# Patient Record
Sex: FEMALE | Race: OTHER | HISPANIC OR LATINO | Employment: PART TIME | URBAN - METROPOLITAN AREA
[De-identification: names, ages, dates, MRNs, and addresses within clinical notes are randomized per-mention and may not be internally consistent; named-entity substitution may affect disease eponyms.]

---

## 2018-10-24 ENCOUNTER — APPOINTMENT (EMERGENCY)
Dept: RADIOLOGY | Facility: HOSPITAL | Age: 23
End: 2018-10-24
Payer: COMMERCIAL

## 2018-10-24 ENCOUNTER — HOSPITAL ENCOUNTER (EMERGENCY)
Facility: HOSPITAL | Age: 23
Discharge: HOME | End: 2018-10-24
Attending: EMERGENCY MEDICINE | Admitting: EMERGENCY MEDICINE
Payer: COMMERCIAL

## 2018-10-24 VITALS
DIASTOLIC BLOOD PRESSURE: 70 MMHG | SYSTOLIC BLOOD PRESSURE: 148 MMHG | TEMPERATURE: 98.2 F | HEART RATE: 94 BPM | OXYGEN SATURATION: 100 % | RESPIRATION RATE: 18 BRPM

## 2018-10-24 DIAGNOSIS — R11.0 NAUSEA: ICD-10-CM

## 2018-10-24 DIAGNOSIS — J06.9 UPPER RESPIRATORY INFECTION, ACUTE: Primary | ICD-10-CM

## 2018-10-24 PROCEDURE — 71046 X-RAY EXAM CHEST 2 VIEWS: CPT

## 2018-10-24 PROCEDURE — 94640 AIRWAY INHALATION TREATMENT: CPT

## 2018-10-24 PROCEDURE — 99283 EMERGENCY DEPT VISIT LOW MDM: CPT

## 2018-10-24 RX ORDER — METOCLOPRAMIDE 10 MG/1
10 TABLET ORAL ONCE
Status: COMPLETED | OUTPATIENT
Start: 2018-10-24 | End: 2018-10-24

## 2018-10-24 RX ORDER — METOCLOPRAMIDE 10 MG/1
10 TABLET ORAL EVERY 6 HOURS
Qty: 10 TABLET | Refills: 0 | Status: SHIPPED | OUTPATIENT
Start: 2018-10-24 | End: 2019-06-12 | Stop reason: ALTCHOICE

## 2018-10-24 RX ORDER — BENZONATATE 100 MG/1
100 CAPSULE ORAL EVERY 8 HOURS
Qty: 6 CAPSULE | Refills: 0 | Status: SHIPPED | OUTPATIENT
Start: 2018-10-24 | End: 2019-06-12 | Stop reason: ALTCHOICE

## 2018-10-24 RX ADMIN — METOCLOPRAMIDE HYDROCHLORIDE 10 MG: 10 TABLET ORAL at 09:38

## 2018-10-24 RX ADMIN — IPRATROPIUM BROMIDE 0.5 MG: 0.5 SOLUTION RESPIRATORY (INHALATION) at 09:39

## 2018-10-24 RX ADMIN — ALBUTEROL SULFATE 5 MG: 2.5 SOLUTION RESPIRATORY (INHALATION) at 09:38

## 2018-10-24 NOTE — ED PROVIDER NOTES
History  Chief Complaint   Patient presents with    Cough     pt reports clear productive cough since yesterday  pt denies fever  pt also c/o chest congestion  History provided by:  Patient  Cough   Cough characteristics:  Productive  Sputum characteristics:  Clear  Duration:  1 day  Timing:  Constant  Progression:  Unchanged  Chronicity:  New  Context: not sick contacts    Relieved by:  None tried  Worsened by:  Nothing  Ineffective treatments:  None tried  Associated symptoms: rhinorrhea and sinus congestion    Associated symptoms: no chest pain, no chills, no ear pain, no eye discharge, no fever, no rash, no shortness of breath and no sore throat        None       History reviewed  No pertinent past medical history  History reviewed  No pertinent surgical history  History reviewed  No pertinent family history  I have reviewed and agree with the history as documented  Social History   Substance Use Topics    Smoking status: Never Smoker    Smokeless tobacco: Never Used    Alcohol use No        Review of Systems   Constitutional: Negative for chills and fever  HENT: Positive for rhinorrhea  Negative for congestion, ear discharge, ear pain, postnasal drip, sinus pressure, sneezing, sore throat and trouble swallowing  Eyes: Negative for discharge and redness  Respiratory: Positive for cough and chest tightness  Negative for shortness of breath  Cardiovascular: Negative for chest pain  Gastrointestinal: Positive for nausea and vomiting  Negative for abdominal pain and diarrhea  Skin: Negative for rash  All other systems reviewed and are negative  Physical Exam  Physical Exam   Constitutional: She is oriented to person, place, and time  She appears well-developed and well-nourished  Non-toxic appearance  She does not have a sickly appearance  She does not appear ill  HENT:   Head: Normocephalic and atraumatic  Right Ear: Tympanic membrane normal  No drainage   Tympanic membrane is not injected, not erythematous and not bulging  No middle ear effusion  Left Ear: Tympanic membrane normal  No drainage  Tympanic membrane is not injected, not erythematous and not bulging  No middle ear effusion  Nose: Rhinorrhea present  Mouth/Throat: Oropharynx is clear and moist and mucous membranes are normal  No oropharyngeal exudate, posterior oropharyngeal edema, posterior oropharyngeal erythema or tonsillar abscesses  No tonsillar exudate  Eyes: Conjunctivae are normal  Right eye exhibits no discharge  Left eye exhibits no discharge  Right conjunctiva is not injected  Left conjunctiva is not injected  Neck: Normal range of motion  Neck supple  No neck rigidity  Cardiovascular: Normal rate, regular rhythm, normal heart sounds and intact distal pulses  Exam reveals no gallop and no friction rub  No murmur heard  Pulmonary/Chest: Effort normal and breath sounds normal  No stridor  No respiratory distress  She has no wheezes  She has no rales  Abdominal: Soft  Bowel sounds are normal  She exhibits no distension  There is no tenderness  There is no rebound and no guarding  Lymphadenopathy:     She has no cervical adenopathy  Neurological: She is alert and oriented to person, place, and time  Skin: Skin is warm and dry  No rash noted  No pallor  Nursing note and vitals reviewed        Vital Signs  ED Triage Vitals [10/24/18 0922]   Temperature Pulse Respirations Blood Pressure SpO2   98 2 °F (36 8 °C) 94 18 148/70 100 %      Temp Source Heart Rate Source Patient Position - Orthostatic VS BP Location FiO2 (%)   Oral Monitor Sitting Right arm --      Pain Score       --           Vitals:    10/24/18 0922   BP: 148/70   Pulse: 94   Patient Position - Orthostatic VS: Sitting       Visual Acuity      ED Medications  Medications   metoclopramide (REGLAN) tablet 10 mg (10 mg Oral Given 10/24/18 0938)   albuterol inhalation solution 5 mg (5 mg Nebulization Given 10/24/18 0938) ipratropium (ATROVENT) 0 02 % inhalation solution 0 5 mg (0 5 mg Nebulization Given 10/24/18 8197)       Diagnostic Studies  Results Reviewed     None                 XR chest 2 views   ED Interpretation by Pauline Yañez 24, DO (10/24 1557)   No acute abnormalities                 Procedures  Procedures       Phone Contacts  ED Phone Contact    ED Course  ED Course as of Oct 24 1001   Wed Oct 24, 2018   0957 Pt updated on results  Asking for a work note  [facility]  Number of Diagnoses or Management Options     Amount and/or Complexity of Data Reviewed  Tests in the radiology section of CPT®: ordered and reviewed      CritCare Time    Disposition  Final diagnoses:   Upper respiratory infection, acute   Nausea     Time reflects when diagnosis was documented in both MDM as applicable and the Disposition within this note     Time User Action Codes Description Comment    10/24/2018  9:43 AM Porsha Adan [J06 9] Upper respiratory infection, acute     10/24/2018  9:58 AM Porsha Adan [R11 0] Nausea       ED Disposition     ED Disposition Condition Comment    Discharge  Bebo Solis discharge to home/self care  Condition at discharge: Good        Follow-up Information    None         Patient's Medications   Discharge Prescriptions    BENZONATATE (TESSALON PERLES) 100 MG CAPSULE    Take 1 capsule (100 mg total) by mouth every 8 (eight) hours       Start Date: 10/24/2018End Date: --       Order Dose: 100 mg       Quantity: 6 capsule    Refills: 0    METOCLOPRAMIDE (REGLAN) 10 MG TABLET    Take 1 tablet (10 mg total) by mouth every 6 (six) hours       Start Date: 10/24/2018End Date: --       Order Dose: 10 mg       Quantity: 10 tablet    Refills: 0     No discharge procedures on file      ED Provider  Electronically Signed by           Pauline Packer DO  10/24/18 1001

## 2018-10-24 NOTE — DISCHARGE INSTRUCTIONS
Síntomas de resfriado   LO QUE NECESITA SABER:   Un resfriado es wilmer infección causada por un virus  La infección causa la inflamación del sistema respiratorio superior  Algunos síntomas comunes de un resfriado incluyen estornudos, garganta seca, congestión nasal, dolor de letitia, ojos llorosos, y tos  La tos podría ser seca o incluso contener flemas  Usted también podría sentir mukul musculares, dolor en las articulaciones y cansancio  La fiebre no es un síntoma común del resfrío carole podría suceder  La mayoría de los resfriados se [place] sin tratamiento  INSTRUCCIONES SOBRE EL DESIRE HOSPITALARIA:   Regrese a la radha de emergencias si:   · Usted siente más cansancio y debilidad  · Usted no puede comer  · Porras corazón está latiendo [employer] rápido de lo normal en porras liu  · Usted nota manchas atilio en la parte de atrás de porras garganta y porras jonathon está inflamado y adolorido al tacto  · Usted nota puntos rojos o morados pequeños o grandes en porras piel  Pregúntele a porras Glory Rinne vitaminas y minerales son adecuados para usted  · Usted tiene fiebre por encima de los 102°F (38 9°C)  · Usted tiene falta de la aire que es reciente o que Ranjeet Motts  · Usted tiene wilmer secreción espesa saliéndole de la nariz por más de 2 días  · Eva síntomas no mejoran o más monique empeoran en cuestión de 5 días  · Usted tiene preguntas o inquietudes acerca de porras condición o cuidado  Medicamentos:  Los [employer]  podrían  ser parte de los medicamentos sugeridos por porras médico para disminuir los síntomas de porras resfrío  Estos medicamentos están disponibles sin receta médica  Pregunte cuáles medicamentos sergei y cuándo tomarlos  \[facility] [place] [facility]\""mecca 645  · Los AINEs o el acetaminofén  ayudan a bajar la fiebre o disminuir el dolor  Los descongestionantes    · Los descongestionantes  ayudan a disminuir la congestión nasal      · Los antihistamínicos  ayudan a disminuir los estornudos y el flujo nasal  · Los jarabes para la tos  ayudan a disminuir la tos  · Los expectorantes  ayudan a aflojar las flemas para que las pueda toser  · [place] katarzyna medicamentos zonia se le haya indicado  Consulte con porras médico si usted dennys que porras medicamento no le está ayudando o si presenta efectos secundarios  Infórmele si es alérgico a algún medicamento  Mantenga wilmer lista actualizada de los [place], las vitaminas y los productos herbales que bessie  Incluya los siguientes datos de los medicamentos: cantidad, frecuencia y motivo de administración  Traiga con usted la lista o los envases de la píldoras a katarzyna citas de seguimiento  Lleve la lista de los medicamentos con usted en liu de wilmer emergencia  Alivio de los síntomas:  Los siguientes podría ayudar a aliviar los síntomas del resfrío, zonia por ejemplo la garganta seca y la congestión:  · Gárgaras con enjuague bucal o agua salada tibia según las instrucciones  · Pastillas para la garganta o golosinas duras  · Vaporizador de grace fría o tibia o humidificador para aliviar la respiración  · [place] de por lo menos 2 días y luego según lo necesario para ir eliminando el cansancio y la debilidad  · Póngase crema a base de petrolato alrededor de las fosas nasales para disminuir la irritación causada por estarse limpiando tanto la nariz  [place] líquidos:  Los líquidos le ayudarán a aflojar y disolver la mucosidad espesa para que la pueda expectorar  Los líquidos también lo mantendrán hidratado  Pregúntele a porras médico cuáles líquidos le recomienda y cuánta cantidad debe sergei cada día  Prevenga la propagación de gérmenes:  Es posible propagar los gérmenes del resfriado a otras personas por lo menos por 3 días después de loni iniciado los síntomas  Lávese las gavin frecuentemente  No comparta artículos, zonia utensilios de cocina  Cúbrase la nariz y la boca cuando tose o estornuda usando la parte de adentro del codo en vez de las gavin   Tire en [address] toallas desechables que usó para limpiarse la nariz  No fume:  El fumar podría empeorar katarzyna síntomas y aumentar la duración de ba resfriado  Hable con ba médico si necesita ayuda para dejar de fumar  Acuda a katarzyna consultas de control con ba médico según le indicaron  Anote katarzyna preguntas para que se acuerde de hacerlas kirsten katarzyna visitas  © 2017 [address] Information is for End User's use only and may not be sold, redistributed or otherwise used for commercial purposes  All illustrations and images included in CareNotes® are the copyrighted property of A D A M , Inc  or Cliff Lind  Esta información es sólo para uso en educación  Ba intención no es darle un consejo médico sobre enfermedades o tratamientos  Colsulte con ba Fatuma Antis farmacéutico antes de seguir cualquier régimen médico para saber si es seguro y efectivo para usted

## 2018-10-24 NOTE — LETTER
October 24, 2018    [address]      Dear Ms Tenorio:    Please contact the emergency department at the telephone number above ASAP regarding results from your visit to the ED on 10/24/2018        Sincerely,             NICOLE Philippe  Nurse Practitioner        CC: No Recipients

## 2018-10-24 NOTE — PROGRESS NOTES
Attempted to call telephone number on file, unable to leave voicemail  Will send letter to contact the ER

## 2018-11-02 NOTE — ED NOTES
11/2/18 at 2:24pm: Pt called stating that she received a letter to call about lab results  Pt states that she was seen for cough and congestion and states that her symptoms improved with the prescriptions given but she still has the cough  Pt informed that the chest x-ray showed possible start of R lower lobe pneumonia  Discussed with pt that will call in a prescription for azithromycin to [facility] at [address] [place] [address]       Anthony De Anda PA-C  11/02/18 2503

## 2018-12-04 ENCOUNTER — APPOINTMENT (EMERGENCY)
Dept: RADIOLOGY | Facility: HOSPITAL | Age: 23
End: 2018-12-04
Payer: COMMERCIAL

## 2018-12-04 ENCOUNTER — HOSPITAL ENCOUNTER (EMERGENCY)
Facility: HOSPITAL | Age: 23
Discharge: LEFT BEFORE DISCHARGE | End: 2018-12-04
Admitting: EMERGENCY MEDICINE
Payer: COMMERCIAL

## 2018-12-04 ENCOUNTER — HOSPITAL ENCOUNTER (EMERGENCY)
Facility: HOSPITAL | Age: 23
Discharge: HOME | End: 2018-12-04
Admitting: EMERGENCY MEDICINE
Payer: COMMERCIAL

## 2018-12-04 VITALS
HEART RATE: 96 BPM | TEMPERATURE: 97.9 F | SYSTOLIC BLOOD PRESSURE: 113 MMHG | DIASTOLIC BLOOD PRESSURE: 65 MMHG | RESPIRATION RATE: 17 BRPM | OXYGEN SATURATION: 100 %

## 2018-12-04 VITALS
SYSTOLIC BLOOD PRESSURE: 130 MMHG | DIASTOLIC BLOOD PRESSURE: 60 MMHG | RESPIRATION RATE: 16 BRPM | TEMPERATURE: 97.7 F | OXYGEN SATURATION: 100 % | HEART RATE: 82 BPM | WEIGHT: 196.6 LBS

## 2018-12-04 DIAGNOSIS — M79.673 FOOT PAIN: Primary | ICD-10-CM

## 2018-12-04 PROCEDURE — 73630 X-RAY EXAM OF FOOT: CPT

## 2018-12-04 PROCEDURE — 99283 EMERGENCY DEPT VISIT LOW MDM: CPT

## 2018-12-04 NOTE — ED NOTES
X-ray here to perform portable x-ray at bedside  Pt  not in room       Coty Johnson RN  12/04/18 7008

## 2018-12-04 NOTE — ED NOTES
Assumed care of pt at this time  Pt no longer in room or bathroom  Provider made aware        Radha Westfall RN  12/04/18 1886

## 2018-12-04 NOTE — ED PROVIDER NOTES
History  Chief Complaint   Patient presents with    Foot Injury     [place] phone utilized for triage-patient Indonesian speaking  Reports right foot pain, shares that dresser fell on affected foot today  Pt is a 24year old female presenting with right foot pain for 1 hour  Pt states she had a vanity fall on her right foot  She is able to ambulate but with severe pain  States the pain is 8/10 sharp and dorsal to her hallux and 2nd digit  She has sensation of her toes and is able to move them  She denies fracture to the foot before  No decreased ROM  She was seen by me in the ED earlier today but left, and rechecked herself back in  Prior to Admission Medications   Prescriptions Last Dose Informant Patient Reported? Taking?   benzonatate (TESSALON PERLES) 100 mg capsule   No No   Sig: Take 1 capsule (100 mg total) by mouth every 8 (eight) hours   metoclopramide (REGLAN) 10 mg tablet   No No   Sig: Take 1 tablet (10 mg total) by mouth every 6 (six) hours      Facility-Administered Medications: None       History reviewed  No pertinent past medical history  History reviewed  No pertinent surgical history  History reviewed  No pertinent family history  I have reviewed and agree with the history as documented  Social History   Substance Use Topics    Smoking status: Never Smoker    Smokeless tobacco: Never Used    Alcohol use No        Review of Systems   Constitutional: Negative for chills and fever  Respiratory: Negative for chest tightness and shortness of breath  Cardiovascular: Negative for chest pain  Gastrointestinal: Negative for abdominal pain, diarrhea, nausea and vomiting  Genitourinary: Negative for decreased urine volume and difficulty urinating  Musculoskeletal: Positive for gait problem and joint swelling  Right foot pain  Physical Exam  Physical Exam   Constitutional: She appears well-developed and well-nourished  No distress     HENT:   Head: Normocephalic and atraumatic  Eyes: Conjunctivae and EOM are normal    Neck: Normal range of motion  Neck supple  Cardiovascular: Normal rate, regular rhythm, normal heart sounds and intact distal pulses  Pulses:       Dorsalis pedis pulses are 2+ on the right side, and 2+ on the left side  Pulmonary/Chest: Effort normal and breath sounds normal    Abdominal: Soft  Bowel sounds are normal    Musculoskeletal: She exhibits tenderness  Feet:    Skin: Skin is warm and dry  Capillary refill takes less than 2 seconds  She is not diaphoretic  Vital Signs  ED Triage Vitals [12/04/18 1540]   Temperature Pulse Respirations Blood Pressure SpO2   97 7 °F (36 5 °C) 82 16 130/60 100 %      Temp src Heart Rate Source Patient Position - Orthostatic VS BP Location FiO2 (%)   -- Monitor Sitting Right arm --      Pain Score       4           Vitals:    12/04/18 1540   BP: 130/60   Pulse: 82   Patient Position - Orthostatic VS: Sitting       Visual Acuity      ED Medications  Medications - No data to display    Diagnostic Studies  Results Reviewed     None                 XR foot 3+ views RIGHT    (Results Pending)              Procedures  Procedures       Phone Contacts  ED Phone Contact    ED Course                               MDM  Number of Diagnoses or Management Options  Foot pain:   Diagnosis management comments: Pt did not appear to have a fracture on x-ray, she was offered crutches but refused them  RICE for treatment of contusion  CritCare Time    Disposition  Final diagnoses:   None     ED Disposition     None      Follow-up Information    None         Patient's Medications   Discharge Prescriptions    No medications on file     No discharge procedures on file      ED Provider  Electronically Signed by           Heather Camarillo PA-C  12/04/18 8474

## 2018-12-04 NOTE — ED PROVIDER NOTES
History  Chief Complaint   Patient presents with    Foot Pain     pt presents with right foot pain reports hitting it on a dresser  swelling noted to area  Pt is a 24year old female presenting with right foot pain for 1 hour  Pt states she had a vanity fall on her right foot  She is able to ambulate but with severe pain  States the pain is 8/10 sharp and dorsal to her hallux and 2nd digit  She has sensation of her toes and is able to move them  She denies fracture to the foot before  No decreased ROM   [id number]    After speaking to the patient over the  phone, I evaluated the patient with a physical exam and told the patient she would be getting an Xray of her right foot  I saw another patient while she was awaiting her imaging, and the patient had left AMA without receiving her xray or speaking to another provider  I was unable to fully assess her foot, and she left without treatment or patient education  Prior to Admission Medications   Prescriptions Last Dose Informant Patient Reported? Taking?   benzonatate (TESSALON PERLES) 100 mg capsule   No No   Sig: Take 1 capsule (100 mg total) by mouth every 8 (eight) hours   metoclopramide (REGLAN) 10 mg tablet   No No   Sig: Take 1 tablet (10 mg total) by mouth every 6 (six) hours      Facility-Administered Medications: None       History reviewed  No pertinent past medical history  History reviewed  No pertinent surgical history  History reviewed  No pertinent family history  I have reviewed and agree with the history as documented  Social History   Substance Use Topics    Smoking status: Never Smoker    Smokeless tobacco: Never Used    Alcohol use No        Review of Systems   Constitutional: Negative for chills and fever  Respiratory: Negative for chest tightness and shortness of breath  Cardiovascular: Negative for chest pain     Gastrointestinal: Negative for abdominal pain, diarrhea, nausea and vomiting  Musculoskeletal: Positive for gait problem and joint swelling  Right foot pain  Skin: Negative for rash and wound  Neurological: Negative for dizziness, weakness, light-headedness and numbness  Physical Exam  Physical Exam   Constitutional: She is oriented to person, place, and time  She appears well-developed and well-nourished  HENT:   Head: Normocephalic and atraumatic  Eyes: Conjunctivae and EOM are normal    Neck: Normal range of motion  Neck supple  Cardiovascular: Normal rate and regular rhythm  Pulses:       Dorsalis pedis pulses are 2+ on the right side, and 2+ on the left side  Pulmonary/Chest: Effort normal and breath sounds normal    Abdominal: Soft  Bowel sounds are normal    Musculoskeletal: Normal range of motion  Feet:    Neurological: She is alert and oriented to person, place, and time  No sensory deficit  No sensory deficit in right foot  Skin: Skin is warm and dry  Capillary refill takes less than 2 seconds  Vital Signs  ED Triage Vitals [12/04/18 1408]   Temperature Pulse Respirations Blood Pressure SpO2   97 9 °F (36 6 °C) 96 17 113/65 100 %      Temp Source Heart Rate Source Patient Position - Orthostatic VS BP Location FiO2 (%)   Temporal Monitor Sitting Right arm --      Pain Score       8           Vitals:    12/04/18 1408   BP: 113/65   Pulse: 96   Patient Position - Orthostatic VS: Sitting       Visual Acuity      ED Medications  Medications - No data to display    Diagnostic Studies  Results Reviewed     None                 XR foot 3+ views RIGHT    (Results Pending)              Procedures  Procedures       Phone Contacts  ED Phone Contact    ED Course                               [facility]  CritCare Time    Disposition  Final diagnoses:   None     ED Disposition     None      Follow-up Information    None         Patient's Medications   Discharge Prescriptions    No medications on file     No discharge procedures on file      ED Provider  Electronically Signed by           Janine Jolly PA-C  12/04/18 6328

## 2018-12-04 NOTE — DISCHARGE INSTRUCTIONS
Articulación inflamada   LO QUE NECESITA SABER:   Es probable que la inflamación ocurra en wilmer o más articulaciones  También es probable que usted tenga otros síntomas, zonia dolor, sensibilidad o rigidez  La inflamación de Erasmo Villalobos articulación puede ser el resultado de wilmer variedad de condiciones zonia la artritis, seudogota, gota, tendinitis o lesiones  INSTRUCCIONES SOBRE EL DESIRE HOSPITALARIA:   Regrese a la radha de emergencias si:   · Usted no puede  porras articulación en absolutio  · Usted tiene dolor severo que no mejora con medicación  Pregúntele a porras Leamon Handsome vitaminas y minerales son adecuados para usted  · Usted tiene fiebre  · Usted tiene enrojecimiento o calor sobre porras articulación  · La inflamación no disminuye con el tratamiento  · Usted tiene preguntas o inquietudes acerca de porras condición o cuidado  Medicamentos:   · AINEs (Analgésicos antiinflamatorios no esteroides) zonia el ibuprofeno, ayudan a disminuir la inflamación, el dolor y la fiebre  Luisa medicamento esta disponible con o sin wilmer receta médica  Los AINEs pueden causar sangrado estomacal o problemas renales en ciertas personas  Si usted bessie un medicamento anticoagulante, siempre pregúntele a porras médico si los ISIDRO son seguros para usted  Siempre kianna la etiqueta de luisa medicamento y [place] instrucciones  · [place] katarzyna medicamentos zonia se le haya indicado  Consulte con porras médico si usted dennys que porras medicamento no le está ayudando o si presenta efectos secundarios  Infórmele si es alérgico a algún medicamento  Mantenga wilmer lista actualizada de los Sridevi, las vitaminas y los productos herbales que bessie  Incluya los siguientes datos de los medicamentos: cantidad, frecuencia y motivo de administración  Traiga con usted la lista o los envases de la píldoras a katarzyna citas de seguimiento  Lleve la lista de los medicamentos con usted en liu de wilmer emergencia    Acuda a katarzyna consultas de control con porras médico según le indicaron  Anote katarzyna preguntas para que se acuerde de hacerlas kirsten katarzyna visitas  Cuidados personales:   · Descanse  ba articulación inflamada  Evite actividades que empeoren la inflamación o el dolor  Es probable que usted necesite evitar cargar ba peso sobre ba articulación mintras le duela  Se puede usar [employer] o un andador para evitar apoyar el peso sobre articulaciones en la parte inferior de ba cuerpo  · Aplique hielo  en la glándula inflamada de 15 a 20 minutos cada hora o zonia se le indique  Use un paquete de hielo o ponga hielo molido dentro de [employer]  Cúbrala con wilmer toalla  El hielo ayuda a evitar daño al tejido y a disminuir la inflamación y el dolor  · La aplicación de calor  en la articulación inflamada de 20 a 30 minutos cada 2 horas por los días que se le indiquen  [place] ayuda a disminuir el dolor  · Eleve  ba articulación inflamada de manera que quede por encima del nivel de ba corazón, hágalo con tanta frecuencia zonia sea posible  [place] va a disminuir inflamación y el dolor  Apoye ba articulación en almohadas o cobijas para mantenerla elevada cómodamente  © 2017 [address] [employer] Information is for End User's use only and may not be sold, redistributed or otherwise used for commercial purposes  All illustrations and images included in CareNotes® are the copyrighted property of A D A M , Inc  or Cliff Lind  Esta información es sólo para uso en educación  Ba intención no es darle un consejo médico sobre enfermedades o tratamientos  Colsulte con ba April Clare farmacéutico antes de seguir cualquier régimen médico para saber si es seguro y efectivo para usted

## 2019-02-17 ENCOUNTER — HOSPITAL ENCOUNTER (EMERGENCY)
Facility: HOSPITAL | Age: 24
Discharge: HOME | End: 2019-02-17
Attending: EMERGENCY MEDICINE | Admitting: EMERGENCY MEDICINE

## 2019-02-17 VITALS
SYSTOLIC BLOOD PRESSURE: 148 MMHG | RESPIRATION RATE: 16 BRPM | WEIGHT: 194.8 LBS | OXYGEN SATURATION: 99 % | TEMPERATURE: 98.3 F | HEART RATE: 85 BPM | DIASTOLIC BLOOD PRESSURE: 72 MMHG

## 2019-02-17 DIAGNOSIS — S09.90XA MINOR HEAD INJURY, INITIAL ENCOUNTER: ICD-10-CM
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
## 2019-02-17 PROCEDURE — 99283 EMERGENCY DEPT VISIT LOW MDM: CPT
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
## 2019-04-21 ENCOUNTER — HOSPITAL ENCOUNTER (EMERGENCY)
Facility: HOSPITAL | Age: 24
Discharge: HOME | End: 2019-04-21
Attending: EMERGENCY MEDICINE | Admitting: EMERGENCY MEDICINE
Payer: COMMERCIAL

## 2019-04-21 ENCOUNTER — APPOINTMENT (EMERGENCY)
Dept: RADIOLOGY | Facility: HOSPITAL | Age: 24
End: 2019-04-21
Payer: COMMERCIAL

## 2019-04-21 VITALS
DIASTOLIC BLOOD PRESSURE: 56 MMHG | SYSTOLIC BLOOD PRESSURE: 118 MMHG | OXYGEN SATURATION: 100 % | TEMPERATURE: 98.1 F | RESPIRATION RATE: 16 BRPM | HEART RATE: 95 BPM | WEIGHT: 194.89 LBS

## 2019-04-21 DIAGNOSIS — S20.212A CONTUSION OF RIB ON LEFT SIDE, INITIAL ENCOUNTER: ICD-10-CM

## 2019-04-21 DIAGNOSIS — R07.81 RIB PAIN ON LEFT SIDE: Primary | ICD-10-CM

## 2019-04-21 PROCEDURE — 99283 EMERGENCY DEPT VISIT LOW MDM: CPT

## 2019-04-21 PROCEDURE — 99283 EMERGENCY DEPT VISIT LOW MDM: CPT | Performed by: PHYSICIAN ASSISTANT

## 2019-04-21 PROCEDURE — 71101 X-RAY EXAM UNILAT RIBS/CHEST: CPT

## 2019-04-21 RX ORDER — LIDOCAINE 50 MG/G
1 PATCH TOPICAL EVERY 24 HOURS
Qty: 6 PATCH | Refills: 0 | Status: SHIPPED | OUTPATIENT
Start: 2019-04-21 | End: 2019-06-12 | Stop reason: ALTCHOICE

## 2019-04-21 RX ORDER — LIDOCAINE 50 MG/G
1 PATCH TOPICAL ONCE
Status: DISCONTINUED | OUTPATIENT
Start: 2019-04-21 | End: 2019-04-21 | Stop reason: HOSPADM

## 2019-04-21 RX ORDER — NAPROXEN 500 MG/1
500 TABLET ORAL 2 TIMES DAILY WITH MEALS
Qty: 14 TABLET | Refills: 0 | Status: SHIPPED | OUTPATIENT
Start: 2019-04-21 | End: 2019-06-12 | Stop reason: ALTCHOICE

## 2019-04-21 RX ADMIN — LIDOCAINE 1 PATCH: 50 PATCH TOPICAL at 20:33

## 2019-06-11 VITALS
WEIGHT: 197.09 LBS | HEART RATE: 118 BPM | TEMPERATURE: 101.3 F | DIASTOLIC BLOOD PRESSURE: 76 MMHG | SYSTOLIC BLOOD PRESSURE: 130 MMHG | RESPIRATION RATE: 20 BRPM | OXYGEN SATURATION: 99 %

## 2019-06-11 PROCEDURE — 99284 EMERGENCY DEPT VISIT MOD MDM: CPT

## 2019-06-12 ENCOUNTER — HOSPITAL ENCOUNTER (EMERGENCY)
Facility: HOSPITAL | Age: 24
Discharge: HOME | End: 2019-06-12
Attending: EMERGENCY MEDICINE | Admitting: EMERGENCY MEDICINE
Payer: COMMERCIAL

## 2019-06-12 DIAGNOSIS — R10.10 UPPER ABDOMINAL PAIN: ICD-10-CM

## 2019-06-12 DIAGNOSIS — R50.9 FEVER: ICD-10-CM

## 2019-06-12 DIAGNOSIS — J02.0 STREP PHARYNGITIS: Primary | ICD-10-CM

## 2019-06-12 LAB — EXT PREG TEST URINE: NEGATIVE

## 2019-06-12 PROCEDURE — 99283 EMERGENCY DEPT VISIT LOW MDM: CPT | Performed by: EMERGENCY MEDICINE

## 2019-06-12 PROCEDURE — 81025 URINE PREGNANCY TEST: CPT | Performed by: EMERGENCY MEDICINE

## 2019-06-12 RX ORDER — AMOXICILLIN 500 MG/1
500 CAPSULE ORAL EVERY 12 HOURS SCHEDULED
Qty: 20 CAPSULE | Refills: 0 | Status: SHIPPED | OUTPATIENT
Start: 2019-06-12 | End: 2019-06-22

## 2019-06-12 RX ORDER — ACETAMINOPHEN 325 MG/1
650 TABLET ORAL ONCE
Status: COMPLETED | OUTPATIENT
Start: 2019-06-12 | End: 2019-06-12

## 2019-06-12 RX ORDER — IBUPROFEN 600 MG/1
600 TABLET ORAL ONCE
Status: COMPLETED | OUTPATIENT
Start: 2019-06-12 | End: 2019-06-12

## 2019-06-12 RX ORDER — IBUPROFEN 600 MG/1
600 TABLET ORAL EVERY 6 HOURS PRN
Qty: 30 TABLET | Refills: 0 | Status: SHIPPED | OUTPATIENT
Start: 2019-06-12

## 2019-06-12 RX ORDER — AMOXICILLIN 250 MG/1
500 CAPSULE ORAL ONCE
Status: COMPLETED | OUTPATIENT
Start: 2019-06-12 | End: 2019-06-12

## 2019-06-12 RX ADMIN — AMOXICILLIN 500 MG: 250 CAPSULE ORAL at 00:34

## 2019-06-12 RX ADMIN — ACETAMINOPHEN 650 MG: 325 TABLET ORAL at 00:15

## 2019-06-12 RX ADMIN — IBUPROFEN 600 MG: 600 TABLET ORAL at 00:15

## 2020-01-02 ENCOUNTER — HOSPITAL ENCOUNTER (EMERGENCY)
Facility: HOSPITAL | Age: 25
Discharge: HOME | End: 2020-01-02
Attending: EMERGENCY MEDICINE | Admitting: EMERGENCY MEDICINE
Payer: COMMERCIAL

## 2020-01-02 VITALS
HEART RATE: 114 BPM | SYSTOLIC BLOOD PRESSURE: 125 MMHG | WEIGHT: 206.79 LBS | DIASTOLIC BLOOD PRESSURE: 56 MMHG | RESPIRATION RATE: 16 BRPM | OXYGEN SATURATION: 98 % | TEMPERATURE: 100.3 F

## 2020-01-02 DIAGNOSIS — R68.89 FLU-LIKE SYMPTOMS: Primary | ICD-10-CM

## 2020-01-02 PROCEDURE — 99283 EMERGENCY DEPT VISIT LOW MDM: CPT | Performed by: EMERGENCY MEDICINE

## 2020-01-02 PROCEDURE — 99283 EMERGENCY DEPT VISIT LOW MDM: CPT

## 2020-01-02 RX ORDER — ACETAMINOPHEN 325 MG/1
650 TABLET ORAL ONCE
Status: COMPLETED | OUTPATIENT
Start: 2020-01-02 | End: 2020-01-02

## 2020-01-02 RX ORDER — OSELTAMIVIR PHOSPHATE 75 MG/1
75 CAPSULE ORAL EVERY 12 HOURS
Qty: 10 CAPSULE | Refills: 0 | Status: SHIPPED | OUTPATIENT
Start: 2020-01-02 | End: 2020-01-07

## 2020-01-02 RX ADMIN — ACETAMINOPHEN 650 MG: 325 TABLET ORAL at 09:06

## 2020-01-02 NOTE — ED PROVIDER NOTES
History  Chief Complaint   Patient presents with    Flu Symptoms     fever, cough, body aches since last night  Ibuprofen last taken last night  Patient is a 79-year-old female with no significant past medical history presents for evaluation of flu-like symptoms  She states her symptoms started yesterday  Symptoms include subjective fever, headache, body aches, sore throat, nasal congestion, dry cough  She states there have been sick contacts with similar  Her last dose of ibuprofen was yesterday  She denies any nausea vomiting diarrhea, ear pain, rash, chest pain, shortness breath, abdominal pain  Her last menstrual period was December 15  Prior to Admission Medications   Prescriptions Last Dose Informant Patient Reported? Taking?   ibuprofen (MOTRIN) 600 mg tablet   No No   Sig: Take 1 tablet (600 mg total) by mouth every 6 (six) hours as needed for mild pain or fever      Facility-Administered Medications: None       History reviewed  No pertinent past medical history  Past Surgical History:   Procedure Laterality Date     SECTION      CHOLECYSTECTOMY         History reviewed  No pertinent family history  I have reviewed and agree with the history as documented  Social History     Tobacco Use    Smoking status: Current Every Day Smoker    Smokeless tobacco: Never Used   Substance Use Topics    Alcohol use: Yes     Comment: occassional    Drug use: No        Review of Systems   Constitutional: Positive for chills and fever  HENT: Positive for congestion, rhinorrhea and sore throat  Negative for ear discharge and ear pain  Respiratory: Positive for cough  Negative for shortness of breath, wheezing and stridor  Cardiovascular: Negative for chest pain  Gastrointestinal: Negative for abdominal pain, diarrhea, nausea and vomiting  Genitourinary: Negative for decreased urine volume  Musculoskeletal: Positive for myalgias  Skin: Negative for rash     Neurological: Positive for headaches  All other systems reviewed and are negative  Physical Exam  Physical Exam   Constitutional: She appears well-developed and well-nourished  She is active  Non-toxic appearance  No distress  HENT:   Head: Normocephalic and atraumatic  Right Ear: Hearing, tympanic membrane, external ear and ear canal normal    Left Ear: Hearing, tympanic membrane, external ear and ear canal normal    Nose: Mucosal edema and rhinorrhea present  Mouth/Throat: Uvula is midline and mucous membranes are normal  No oral lesions  No trismus in the jaw  No uvula swelling  Posterior oropharyngeal erythema present  No oropharyngeal exudate, posterior oropharyngeal edema or tonsillar abscesses  No tonsillar exudate  Eyes: Conjunctivae and EOM are normal    Neck: Normal range of motion  Neck supple  Cardiovascular: Normal rate, regular rhythm and normal heart sounds  Exam reveals no gallop and no friction rub  No murmur heard  Pulmonary/Chest: Effort normal and breath sounds normal  No stridor  No respiratory distress  She has no wheezes  Abdominal: Soft  Bowel sounds are normal  She exhibits no distension  There is no tenderness  There is no guarding  Musculoskeletal: Normal range of motion  Neurological: She is alert  Skin: Skin is warm  She is not diaphoretic  Nursing note and vitals reviewed        Vital Signs  ED Triage Vitals [01/02/20 0900]   Temperature Pulse Respirations Blood Pressure SpO2   (!) 101 7 °F (38 7 °C) (!) 130 16 125/56 100 %      Temp Source Heart Rate Source Patient Position - Orthostatic VS BP Location FiO2 (%)   Temporal -- -- -- --      Pain Score       8           Vitals:    01/02/20 0900 01/02/20 1008   BP: 125/56    Pulse: (!) 130 (!) 114         Visual Acuity      ED Medications  Medications   acetaminophen (TYLENOL) tablet 650 mg (650 mg Oral Given 1/2/20 6922)       Diagnostic Studies  Results Reviewed     None                 No orders to display Procedures  Procedures         ED Course                               MDM  Number of Diagnoses or Management Options  Flu-like symptoms:   Diagnosis management comments: Patient with flu-like symptoms that started yesterday  There have been sick contacts with similar  Given Tylenol here for fever  Vitals improved after Tylenol here  Discussed influenza with the patient and treating empirically with Tamiflu  Discussed risks versus benefit  Patient would like to take Tamiflu  Discussed other supportive care for influenza/flu-like symptoms  Follow up with family doctor in 1 day  Return to the ED if symptoms worsen or new symptoms arise  Patient states understanding and agrees with plan  Patient Progress  Patient progress: stable        Disposition  Final diagnoses:   Flu-like symptoms     Time reflects when diagnosis was documented in both MDM as applicable and the Disposition within this note     Time User Action Codes Description Comment    1/2/2020 10:09 AM Demetrio Haney Add [R68 89] Flu-like symptoms       ED Disposition     ED Disposition Condition Date/Time Comment    Discharge Stable Thu Jan 2, 2020 10:09 AM Alberto Barrett discharge to home/self care              Follow-up Information     Follow up With Specialties Details Why Contact Info Additional Information    Sushma Ramos MD Internal Medicine Schedule an appointment as soon as possible for a visit in 1 day  [address] [place] [address]        [facility] Emergency Medicine  If symptoms worsen [facility] [address]  [phone] [place] ED, [address], [place], [address]          Discharge Medication List as of 1/2/2020 10:11 AM      START taking these medications    Details   oseltamivir (TAMIFLU) 75 mg capsule Take 1 capsule (75 mg total) by mouth every 12 (twelve) hours for 5 days, Starting Thu 1/2/2020, Until Tue 1/7/2020, Print         CONTINUE these medications which have NOT CHANGED    Details   ibuprofen (MOTRIN) 600 mg tablet Take 1 tablet (600 mg total) by mouth every 6 (six) hours as needed for mild pain or fever, Starting Wed 6/12/2019, Print           No discharge procedures on file      ED Provider  Electronically Signed by           Deonna Garcia PA-C  01/02/20 8570

## 2021-09-06 ENCOUNTER — HOSPITAL ENCOUNTER (EMERGENCY)
Facility: HOSPITAL | Age: 26
Discharge: HOME | End: 2021-09-06
Attending: EMERGENCY MEDICINE | Admitting: EMERGENCY MEDICINE
Payer: COMMERCIAL

## 2021-09-06 VITALS
RESPIRATION RATE: 20 BRPM | TEMPERATURE: 98 F | SYSTOLIC BLOOD PRESSURE: 124 MMHG | HEART RATE: 90 BPM | OXYGEN SATURATION: 99 % | DIASTOLIC BLOOD PRESSURE: 79 MMHG

## 2021-09-06 DIAGNOSIS — Z20.822 ENCOUNTER FOR SCREENING LABORATORY TESTING FOR COVID-19 VIRUS: ICD-10-CM

## 2021-09-06 DIAGNOSIS — H60.92 LEFT OTITIS EXTERNA: ICD-10-CM

## 2021-09-06 DIAGNOSIS — B34.9 ACUTE VIRAL SYNDROME: Primary | ICD-10-CM

## 2021-09-06 DIAGNOSIS — H66.90 OTITIS MEDIA: ICD-10-CM

## 2021-09-06 LAB — SARS-COV-2 RNA RESP QL NAA+PROBE: NEGATIVE

## 2021-09-06 PROCEDURE — 99283 EMERGENCY DEPT VISIT LOW MDM: CPT

## 2021-09-06 PROCEDURE — U0005 INFEC AGEN DETEC AMPLI PROBE: HCPCS | Performed by: PHYSICIAN ASSISTANT

## 2021-09-06 PROCEDURE — U0003 INFECTIOUS AGENT DETECTION BY NUCLEIC ACID (DNA OR RNA); SEVERE ACUTE RESPIRATORY SYNDROME CORONAVIRUS 2 (SARS-COV-2) (CORONAVIRUS DISEASE [COVID-19]), AMPLIFIED PROBE TECHNIQUE, MAKING USE OF HIGH THROUGHPUT TECHNOLOGIES AS DESCRIBED BY CMS-2020-01-R: HCPCS | Performed by: PHYSICIAN ASSISTANT

## 2021-09-06 PROCEDURE — 99284 EMERGENCY DEPT VISIT MOD MDM: CPT | Performed by: PHYSICIAN ASSISTANT

## 2021-09-06 RX ORDER — OFLOXACIN 3 MG/ML
10 SOLUTION AURICULAR (OTIC) DAILY
Qty: 5 ML | Refills: 0 | Status: SHIPPED | OUTPATIENT
Start: 2021-09-06 | End: 2021-09-06 | Stop reason: SDUPTHER

## 2021-09-06 RX ORDER — OFLOXACIN 3 MG/ML
10 SOLUTION AURICULAR (OTIC) DAILY
Qty: 5 ML | Refills: 0 | Status: SHIPPED | OUTPATIENT
Start: 2021-09-06

## 2021-09-06 RX ORDER — AMOXICILLIN 500 MG/1
500 TABLET, FILM COATED ORAL 2 TIMES DAILY
Qty: 20 TABLET | Refills: 0 | Status: SHIPPED | OUTPATIENT
Start: 2021-09-06 | End: 2021-09-16

## 2021-09-06 RX ORDER — AMOXICILLIN 500 MG/1
500 TABLET, FILM COATED ORAL 2 TIMES DAILY
Qty: 20 TABLET | Refills: 0 | Status: SHIPPED | OUTPATIENT
Start: 2021-09-06 | End: 2021-09-06 | Stop reason: SDUPTHER

## 2021-09-06 NOTE — ED PROVIDER NOTES
History  Chief Complaint   Patient presents with    Cough     Nasal congestion, cough, sore throat for 3 days  26y  o female with no significant PMH presents to the ER for bilateral ear pain, sore throat, cough and nasal congestion for 1 week  Patient has been taking OTC medication for symptoms with relief  Cough is productive  Symptoms are constant  She denies sick contacts or recent travel  She denies fever, chills, chest pain, dyspnea, N/V/D, abdominal pain, weakness or paresthesias  History provided by:  Patient   used: No        Prior to Admission Medications   Prescriptions Last Dose Informant Patient Reported? Taking?   ibuprofen (MOTRIN) 600 mg tablet   No No   Sig: Take 1 tablet (600 mg total) by mouth every 6 (six) hours as needed for mild pain or fever      Facility-Administered Medications: None       History reviewed  No pertinent past medical history  Past Surgical History:   Procedure Laterality Date     SECTION      CHOLECYSTECTOMY         History reviewed  No pertinent family history  I have reviewed and agree with the history as documented  E-Cigarette/Vaping     E-Cigarette/Vaping Substances     Social History     Tobacco Use    Smoking status: Current Every Day Smoker     Packs/day: 0 25    Smokeless tobacco: Never Used   Substance Use Topics    Alcohol use: Yes     Comment: occassional    Drug use: No       Review of Systems   Constitutional: Negative for activity change, appetite change, chills and fever  HENT: Positive for congestion, ear pain, rhinorrhea and sore throat  Negative for drooling, ear discharge and facial swelling  Eyes: Negative for redness  Respiratory: Positive for cough  Negative for shortness of breath  Cardiovascular: Negative for chest pain  Gastrointestinal: Negative for abdominal pain, diarrhea, nausea and vomiting  Musculoskeletal: Negative for neck stiffness  Skin: Negative for rash  Allergic/Immunologic: Negative for food allergies  Neurological: Negative for weakness and numbness  Physical Exam  Physical Exam  Vitals and nursing note reviewed  Constitutional:       General: She is not in acute distress  Appearance: She is not toxic-appearing  HENT:      Head: Normocephalic and atraumatic  Right Ear: Ear canal and external ear normal  No drainage, swelling or tenderness  No foreign body  No hemotympanum  Tympanic membrane is injected and erythematous  Left Ear: Ear canal and external ear normal  Swelling and tenderness present  No drainage  No foreign body  No hemotympanum  Tympanic membrane is injected and erythematous  Nose: Nose normal       Mouth/Throat:      Lips: Pink  Mouth: Mucous membranes are moist       Pharynx: Uvula midline  No pharyngeal swelling, oropharyngeal exudate, posterior oropharyngeal erythema or uvula swelling  Tonsils: No tonsillar exudate or tonsillar abscesses  Eyes:      Conjunctiva/sclera: Conjunctivae normal    Neck:      Trachea: Phonation normal  No tracheal deviation  Cardiovascular:      Rate and Rhythm: Normal rate and regular rhythm  Heart sounds: Normal heart sounds, S1 normal and S2 normal  No murmur heard  No friction rub  No gallop  Pulmonary:      Effort: Pulmonary effort is normal  No respiratory distress  Breath sounds: Normal breath sounds  No decreased breath sounds, wheezing, rhonchi or rales  Chest:      Chest wall: No tenderness  Abdominal:      General: There is no distension  Musculoskeletal:      Cervical back: Normal range of motion and neck supple  Skin:     General: Skin is warm and dry  Findings: No rash  Neurological:      Mental Status: She is alert  GCS: GCS eye subscore is 4  GCS verbal subscore is 5  GCS motor subscore is 6     Psychiatric:         Mood and Affect: Mood normal          Vital Signs  ED Triage Vitals [09/06/21 1439]   Temperature Pulse Respirations Blood Pressure SpO2   98 °F (36 7 °C) 90 20 124/79 99 %      Temp Source Heart Rate Source Patient Position - Orthostatic VS BP Location FiO2 (%)   Oral Monitor Sitting Right arm --      Pain Score       8           Vitals:    09/06/21 1439   BP: 124/79   Pulse: 90   Patient Position - Orthostatic VS: Sitting         Visual Acuity      ED Medications  Medications - No data to display    Diagnostic Studies  Results Reviewed     Procedure Component Value Units Date/Time    Novel Coronavirus (Covid-19),PCR [facility] - 24 Hour Routine [[id number]] Collected: 09/06/21 1521    Lab Status: In process Specimen: Nasopharyngeal Swab Updated: 09/06/21 1523                 No orders to display              Procedures  Procedures         ED Course                                           MDM  Number of Diagnoses or Management Options  Acute viral syndrome: new and requires workup  Encounter for screening laboratory testing for COVID-19 virus: new and requires workup  Left otitis externa: new and requires workup  Otitis media: new and requires workup  Diagnosis management comments: DDX consists of but not limited to: viral syndrome, covid, pneumonia, otitis media, otitis externa, strep    Will check a covid test  Will discharge patient prior to covid test resulting  Will call patient with results  Patient agreeable  The management plan was discussed in detail with the patient at bedside and all questions were answered  Prior to discharge, we provided both verbal and written instructions  We discussed with the patient the signs and symptoms for which to return to the emergency department  All questions were answered and patient was comfortable with the plan of care and discharged to home  Instructed the patient to follow up with the primary care provider and/or specialist provided and their written instructions    The patient verbalized understanding of our discussion and plan of care, and agrees to return to the Emergency Department for concerns and progression of illness  At discharge, I instructed the patient to:  -follow up with pcp  -take Tylenol or Motrin for pain or fever  -take Amoxicillin as prescribed  -apply Ofloxacin drops as prescribed  -rest and drink plenty of fluids  -return to the ER if symptoms worsened or new symptoms arose  Patient agreed to this plan and was stable at time of discharge  Amount and/or Complexity of Data Reviewed  Clinical lab tests: ordered and reviewed    Patient Progress  Patient progress: stable      Disposition  Final diagnoses:   Acute viral syndrome   Encounter for screening laboratory testing for COVID-19 virus   Otitis media   Left otitis externa     Time reflects when diagnosis was documented in both MDM as applicable and the Disposition within this note     Time User Action Codes Description Comment    9/6/2021  3:12 PM Alan Villavicencio Add [B34 9] Acute viral syndrome     9/6/2021  3:12 PM Alan Villavicencio Add [Z20 822] Encounter for screening laboratory testing for COVID-19 virus     9/6/2021  3:12 PM Mort Dills A Add [H66 90] Otitis media     9/6/2021  3:12 PM Alan Villavicencio Add [H60 92] Left otitis externa       ED Disposition     ED Disposition Condition Date/Time Comment    Discharge Stable Mon Sep 6, 2021  3:12  [address] discharge to home/self care              Follow-up Information     Follow up With Specialties Details Why Contact Marilin Swain MD Internal Medicine Schedule an appointment as soon as possible for a visit  As needed [address] [place] [address]             Discharge Medication List as of 9/6/2021  3:15 PM      START taking these medications    Details   amoxicillin (AMOXIL) 500 MG tablet Take 1 tablet (500 mg total) by mouth 2 (two) times a day for 10 days, Starting Mon 9/6/2021, Until Thu 9/16/2021, Normal      ofloxacin (FLOXIN) 0 3 % otic solution Administer 10 drops into the left ear daily, Starting Mon 9/6/2021, Normal         CONTINUE these medications which have NOT CHANGED    Details   ibuprofen (MOTRIN) 600 mg tablet Take 1 tablet (600 mg total) by mouth every 6 (six) hours as needed for mild pain or fever, Starting Wed 6/12/2019, Print           No discharge procedures on file      PDMP Review     None          ED Provider  Electronically Signed by           Barbette Schlatter, PA-C  09/06/21 1525

## 2021-09-06 NOTE — DISCHARGE INSTRUCTIONS
DISCHARGE INSTRUCTIONS:    FOLLOW UP WITH YOUR PRIMARY CARE PROVIDER OR THE [address] [place] [address]  MAKE AN APPOINTMENT TO BE SEEN  TAKE MEDICATION AS PRESCRIBED  IF RASH, SHORTNESS OF BREATH OR TROUBLE SWALLOWING, STOP TAKING THE MEDICATION AND BE SEEN  TAKE TYLENOL OR MOTRIN FOR PAIN OR FEVER  REST AND DRINK PLENTY OF FLUIDS  IF SYMPTOMS WORSEN OR NEW SYMPTOMS ARISE, RETURN TO THE ER TO BE SEEN